# Patient Record
Sex: MALE | Race: WHITE | NOT HISPANIC OR LATINO | Employment: FULL TIME | URBAN - METROPOLITAN AREA
[De-identification: names, ages, dates, MRNs, and addresses within clinical notes are randomized per-mention and may not be internally consistent; named-entity substitution may affect disease eponyms.]

---

## 2023-10-04 ENCOUNTER — TELEMEDICINE (OUTPATIENT)
Dept: PRIMARY CARE | Facility: CLINIC | Age: 33
End: 2023-10-04
Payer: COMMERCIAL

## 2023-10-04 DIAGNOSIS — K52.9 GASTROENTERITIS: Primary | ICD-10-CM

## 2023-10-04 PROCEDURE — 99442 PR PHYS/QHP TELEPHONE EVALUATION 11-20 MIN: CPT | Performed by: INTERNAL MEDICINE

## 2023-10-04 RX ORDER — FAMOTIDINE 20 MG/1
20 TABLET, FILM COATED ORAL 2 TIMES DAILY
Qty: 14 TABLET | Refills: 0 | Status: SHIPPED | OUTPATIENT
Start: 2023-10-04 | End: 2023-10-11

## 2023-10-04 RX ORDER — ONDANSETRON 4 MG/1
4 TABLET, FILM COATED ORAL EVERY 8 HOURS PRN
Qty: 10 TABLET | Refills: 0 | Status: SHIPPED | OUTPATIENT
Start: 2023-10-04 | End: 2023-10-07

## 2023-10-04 NOTE — LETTER
October 5, 2023     Patient: Braulio Bahena   YOB: 1990   Date of Visit: 10/4/2023       To Whom It May Concern:    Braulio Bahena was seen in my clinic on 10/4/2023 at 6:40 pm. Please excuse Braulio from work starting 10/4/23 till 0/5/23 due to his recent illness..    If you have any questions or concerns, please don't hesitate to call.         Sincerely,         Christopher Basilio MD        CC: No Recipients

## 2023-10-04 NOTE — PROGRESS NOTES
Subjective   Patient ID: Uriel Bahena is a 33 y.o. male who presents for No chief complaint on file..    HPI Patient is attended by phone call  because he has been complaining of mild headaches,nausea and diarrhea for the past 5 days He could not be seen by video due internet connection problem.he    denies any vomiting ,neck stiffness,fever ,chills, abdominal Pain  and gastrointestinal bleeding .    Review of Systems   Constitutional: Negative.    HENT: Negative.     Eyes: Negative.    Respiratory: Negative.     Cardiovascular: Negative.    Gastrointestinal:  Positive for diarrhea and nausea.   Endocrine: Negative.    Genitourinary: Negative.    Musculoskeletal: Negative.    Skin: Negative.    Allergic/Immunologic: Negative.    Neurological:  Positive for headaches.   Hematological: Negative.    Psychiatric/Behavioral: Negative.         Objective   There were no vitals taken for this visit.    Physical Examnot done    Assessment/Plan   patient is ecouraged to drink fluid and stay on clear liquids for 24 hours.He is given trial with pepcid and zofran for gastroenteritis  He will be scheduled for stool studies.

## 2023-10-05 ENCOUNTER — TELEPHONE (OUTPATIENT)
Dept: PRIMARY CARE | Facility: CLINIC | Age: 33
End: 2023-10-05
Payer: COMMERCIAL

## 2023-10-08 ASSESSMENT — ENCOUNTER SYMPTOMS
CARDIOVASCULAR NEGATIVE: 1
RESPIRATORY NEGATIVE: 1
HEADACHES: 1
DIARRHEA: 1
MUSCULOSKELETAL NEGATIVE: 1
ENDOCRINE NEGATIVE: 1
EYES NEGATIVE: 1
CONSTITUTIONAL NEGATIVE: 1
NAUSEA: 1
ALLERGIC/IMMUNOLOGIC NEGATIVE: 1
PSYCHIATRIC NEGATIVE: 1
HEMATOLOGIC/LYMPHATIC NEGATIVE: 1

## 2025-04-07 DIAGNOSIS — Z00.6 EXAMINATION OF PARTICIPANT IN CLINICAL TRIAL: ICD-10-CM

## 2025-04-09 ENCOUNTER — HOSPITAL ENCOUNTER (OUTPATIENT)
Dept: RESEARCH | Facility: HOSPITAL | Age: 35
Discharge: HOME | End: 2025-04-09
Payer: COMMERCIAL

## 2025-04-09 VITALS
RESPIRATION RATE: 18 BRPM | HEART RATE: 78 BPM | BODY MASS INDEX: 40.68 KG/M2 | TEMPERATURE: 97.3 F | WEIGHT: 290.57 LBS | SYSTOLIC BLOOD PRESSURE: 113 MMHG | OXYGEN SATURATION: 96 % | HEIGHT: 71 IN | DIASTOLIC BLOOD PRESSURE: 79 MMHG

## 2025-04-09 DIAGNOSIS — Z00.6 EXAMINATION OF PARTICIPANT IN CLINICAL TRIAL: ICD-10-CM

## 2025-04-09 LAB
HOLD SPECIMEN: NORMAL

## 2025-04-09 NOTE — RESEARCH NOTES
[facility] RESEARCH CLINICAL VISIT NOTE  Study Name: SOAR  IRB#: [id number]  [facility]#:   Protocol Version Dated: Amendment 4, June 2024  PI: Francia Costa    Time point: Visit 1   Encounter Date: 04/09/2025  Encounter Time: 12:15 PM EDT  Encounter Department: [facility] RESEARCH        Office Phone Anamaria Arizmendi [phone]    Sally Sims [phone]    Neeta Aaron [phone]      Visit Provider: Sisi Murray RN  Hebert Bahena is a 34 y.o. male and is here for a Research clinical visit.  Allergies: No Known Allergies    Study Regimen    [facility]il[facility] ([place]): Deep Phenotyping  The study will enroll at least 2 members of the same family, including an adolescent 14-18 year-  [place] , to measure psychosocial factors, behavior, cognitive function, brain imaging, brain activity, and biological markers at one time point. If additional funding is secured, this cross-sectional study will also be used as baseline for a group of participants who will be followed longitudinally in an additional study in the coming years    Participant will come to the [facility] for blood draw  Coordinator will complete all other assessments     Admission and Prior to Starting Study Activities    will notify the charge nurse to remove the HbA1c and Lipid cartridges 30 min prior to visit and HBa1c controls if applicable   (ON HOLD TIL FURTHER NOTICE)   will download consent in epic    will run the qc on the machines as instructed  ON HOLD TIL FURTHER NOTICE)  [facility] will obtain the labs and run the test for cholesterol and HbgA1c  ON HOLD TIL FURTHER NOTICE)   Dietary Guidelines   Check with coordinator before offering snacks      Objective   Vital Signs:   Vitals:    04/09/25 1220   BP: 113/79   Pulse: 78   Resp: 18   Temp: 36.3 °C (97.3 °F)   TempSrc: Temporal   SpO2: 96%   Weight: 132 kg (290  "lb 9.1 oz)   Height: 1.806 m (5' 11.1\")       Medications as of the completion of today's visit:      Orders placed during today's visit:  Orders Placed This Encounter   Procedures    Research collection: 10mL Lavender K2 EDTA - Clinical Collect     Standing Status:   Standing     Number of Occurrences:   1    Research collection: 10mL Lavender K2 EDTA - Clinical Collect     Standing Status:   Standing     Number of Occurrences:   1    Research collection: 10mL Lavender K2 EDTA - Clinical Collect     Standing Status:   Standing     Number of Occurrences:   1    Research collection: 10mL Lavender K2 EDTA - Clinical Collect     Standing Status:   Standing     Number of Occurrences:   1       No study found    Study Specific Instructions and Documentation   Snapshot of performable actions in the order performed:  Assessments per coordinator (4 hours) with possible neuro testing  Obtain weight in pounds (wearing light clothing without shoes) 131.8 kg  Obtain Height   record in inches with shoes off. 180.6 cm  Vital signs, document in epic, per [facility] standards  Coordinator will do the QC  on the machines and document in binder next to the machine  [facility] will document test result in the smart phrase and CLIA log located in the binder next to the machine, one log for each test  Obtain the labs ordered and testing of Cholesterol and HgbA1c     Hemoglobin A1c Test: Afinion 2 Analyzer device:  ON HOLD  (QC is done by the coordinator, testing is done by [facility] nurse)   Storage: The Afinion™ HbA1c Test Cartridges   Refrigerated storage 2-8°C (36-46°F)  Do not freeze.  Storage: The Afinion™ HbA1c Controls ([employer] Part #[id number].)  Store in a refrigerator   Opened controls are stable for 60 days.   Allow the control to reach room temp before running (15-30 minutes)  QC:   Performed with each new shipment of HbA1c kits, each new lot of HbA1c kits, at least every 30 days, and anytime an unexpected result occurs    Running a participant " sample:   The Afinion™ HbA1c Test Cartridge must reach an operating temperature of 18-30°C (64-86°F) before use.  Upon removal from refrigerated storage, leave the test cartridge in the unopened foil pouch for at least 15 minutes. Information code 210 will be displayed and no test result obtained if the test cartridge is too cold when used.  The test cartridge should be used within 10 minutes after opening. Avoid exposure to direct sunlight.          Research Correlatives: Deliver to [facility] Lab   Confirm following to orders placed in Epic:   DELIVER TO [facility] LAB ASAP, Must start processing within 20 minutes of collection   Access Type: 21 G BF Location: [facility]   Time point Specimen Test Volume Tube Handling Draw Time   [facility] Research labs  4 x 10 mL  EDTA  On ice, Invert 8-10X 1228   Anytime HbA1c and cholesterol   Machine provided by the study team,   located in the lab or dirty utility room.   (Pipette .25ml  blood from the above samples for testing) keep samples on ice until ready to do the assays  HbA1c Result N/A  Cholesterol result N/A On hold   Anytime Finger prick for cholesterol and HbA1c if not obtaining the above venipuncture, check with coordinator   Machine provided by the study team,   located in the [facility] lab room room.  Must complete audit prior to using the equipment and document the results in the audit form located with the machine.  HbA1c Result N/A  Cholesterol result N/A On hold     Discharge Instructions   Patient may be discharged to home after study requirements are completed      Charges and Wrap-up    NO CHARGES IN EPIC FOR THIS VISIT     2- natacha Murray RN  04/09/25